# Patient Record
Sex: FEMALE | Race: BLACK OR AFRICAN AMERICAN | Employment: UNEMPLOYED | URBAN - METROPOLITAN AREA
[De-identification: names, ages, dates, MRNs, and addresses within clinical notes are randomized per-mention and may not be internally consistent; named-entity substitution may affect disease eponyms.]

---

## 2023-01-18 ENCOUNTER — HOSPITAL ENCOUNTER (EMERGENCY)
Age: 23
Discharge: HOME | End: 2023-01-18
Payer: MEDICAID

## 2023-01-18 VITALS
SYSTOLIC BLOOD PRESSURE: 134 MMHG | DIASTOLIC BLOOD PRESSURE: 84 MMHG | RESPIRATION RATE: 17 BRPM | TEMPERATURE: 99.2 F | OXYGEN SATURATION: 98 % | HEART RATE: 70 BPM

## 2023-01-18 DIAGNOSIS — Z20.822 LAB TEST NEGATIVE FOR COVID-19 VIRUS: ICD-10-CM

## 2023-01-18 DIAGNOSIS — Z32.02 NEGATIVE PREGNANCY TEST: ICD-10-CM

## 2023-01-18 DIAGNOSIS — B96.89 BACTERIAL VAGINOSIS: ICD-10-CM

## 2023-01-18 DIAGNOSIS — N94.9 GENITAL LESION, FEMALE: ICD-10-CM

## 2023-01-18 DIAGNOSIS — R21 RASH AND OTHER NONSPECIFIC SKIN ERUPTION: ICD-10-CM

## 2023-01-18 DIAGNOSIS — A59.01 TRICHOMONAS VAGINITIS: Primary | ICD-10-CM

## 2023-01-18 DIAGNOSIS — N76.0 BACTERIAL VAGINOSIS: ICD-10-CM

## 2023-01-18 LAB
AMORPHOUS: ABNORMAL /HPF
BACTERIA WET PREP: ABNORMAL
BACTERIA: ABNORMAL /HPF
BILIRUBIN URINE: NEGATIVE
BLOOD, URINE: ABNORMAL
CLARITY: ABNORMAL
CLUE CELLS: ABNORMAL
COLOR: YELLOW
COMMENT UA: ABNORMAL
EPITHELIAL CELLS WET PREP: ABNORMAL
EPITHELIAL CELLS, UA: 5 /HPF (ref 0–5)
GLUCOSE URINE: NEGATIVE MG/DL
HCG(URINE) PREGNANCY TEST: NEGATIVE
HYALINE CASTS: 0 /LPF (ref 0–8)
KETONES, URINE: ABNORMAL MG/DL
LEUKOCYTE ESTERASE, URINE: ABNORMAL
MICROSCOPIC EXAMINATION: YES
MUCUS: ABNORMAL /LPF
NITRITE, URINE: NEGATIVE
PH UA: 6 (ref 5–8)
PROTEIN UA: ABNORMAL MG/DL
RAPID INFLUENZA  B AGN: NEGATIVE
RAPID INFLUENZA A AGN: NEGATIVE
RBC UA: 19 /HPF (ref 0–4)
RBC WET PREP: ABNORMAL
S PYO AG THROAT QL: NEGATIVE
SARS-COV-2, NAAT: NOT DETECTED
SOURCE WET PREP: ABNORMAL
SPECIFIC GRAVITY UA: 1.02 (ref 1–1.03)
TRICHOMONAS PREP: ABNORMAL
URINE REFLEX TO CULTURE: YES
URINE TYPE: ABNORMAL
UROBILINOGEN, URINE: 1 E.U./DL
WBC UA: 23 /HPF (ref 0–5)
WBC WET PREP: ABNORMAL
YEAST WET PREP: ABNORMAL

## 2023-01-18 PROCEDURE — 81001 URINALYSIS AUTO W/SCOPE: CPT

## 2023-01-18 PROCEDURE — 6360000002 HC RX W HCPCS: Performed by: NURSE PRACTITIONER

## 2023-01-18 PROCEDURE — 96372 THER/PROPH/DIAG INJ SC/IM: CPT

## 2023-01-18 PROCEDURE — 87086 URINE CULTURE/COLONY COUNT: CPT

## 2023-01-18 PROCEDURE — 87591 N.GONORRHOEAE DNA AMP PROB: CPT

## 2023-01-18 PROCEDURE — 87081 CULTURE SCREEN ONLY: CPT

## 2023-01-18 PROCEDURE — 87077 CULTURE AEROBIC IDENTIFY: CPT

## 2023-01-18 PROCEDURE — 87635 SARS-COV-2 COVID-19 AMP PRB: CPT

## 2023-01-18 PROCEDURE — 87804 INFLUENZA ASSAY W/OPTIC: CPT

## 2023-01-18 PROCEDURE — 87880 STREP A ASSAY W/OPTIC: CPT

## 2023-01-18 PROCEDURE — 99284 EMERGENCY DEPT VISIT MOD MDM: CPT

## 2023-01-18 PROCEDURE — 87210 SMEAR WET MOUNT SALINE/INK: CPT

## 2023-01-18 PROCEDURE — 87253 VIRUS INOCULATE TISSUE ADDL: CPT

## 2023-01-18 PROCEDURE — 87491 CHLMYD TRACH DNA AMP PROBE: CPT

## 2023-01-18 PROCEDURE — 87252 VIRUS INOCULATION TISSUE: CPT

## 2023-01-18 PROCEDURE — 84703 CHORIONIC GONADOTROPIN ASSAY: CPT

## 2023-01-18 PROCEDURE — 6370000000 HC RX 637 (ALT 250 FOR IP): Performed by: NURSE PRACTITIONER

## 2023-01-18 RX ORDER — METRONIDAZOLE 500 MG/1
500 TABLET ORAL 2 TIMES DAILY
Qty: 14 TABLET | Refills: 0 | Status: SHIPPED | OUTPATIENT
Start: 2023-01-18 | End: 2023-01-25

## 2023-01-18 RX ORDER — DOXYCYCLINE HYCLATE 100 MG
100 TABLET ORAL ONCE
Status: COMPLETED | OUTPATIENT
Start: 2023-01-18 | End: 2023-01-18

## 2023-01-18 RX ORDER — CEFTRIAXONE 500 MG/1
500 INJECTION, POWDER, FOR SOLUTION INTRAMUSCULAR; INTRAVENOUS ONCE
Status: COMPLETED | OUTPATIENT
Start: 2023-01-18 | End: 2023-01-18

## 2023-01-18 RX ORDER — DOXYCYCLINE HYCLATE 100 MG
100 TABLET ORAL 2 TIMES DAILY
Qty: 14 TABLET | Refills: 0 | Status: SHIPPED | OUTPATIENT
Start: 2023-01-18 | End: 2023-01-25

## 2023-01-18 RX ORDER — METRONIDAZOLE 500 MG/1
500 TABLET ORAL ONCE
Status: COMPLETED | OUTPATIENT
Start: 2023-01-18 | End: 2023-01-18

## 2023-01-18 RX ORDER — ACYCLOVIR 800 MG/1
800 TABLET ORAL 3 TIMES DAILY
Qty: 30 TABLET | Refills: 0 | Status: SHIPPED | OUTPATIENT
Start: 2023-01-18 | End: 2023-01-28

## 2023-01-18 RX ORDER — ACYCLOVIR 200 MG/1
400 CAPSULE ORAL ONCE
Status: COMPLETED | OUTPATIENT
Start: 2023-01-18 | End: 2023-01-18

## 2023-01-18 RX ADMIN — DOXYCYCLINE HYCLATE 100 MG: 100 TABLET, COATED ORAL at 16:13

## 2023-01-18 RX ADMIN — ACYCLOVIR 400 MG: 200 CAPSULE ORAL at 16:13

## 2023-01-18 RX ADMIN — METRONIDAZOLE 500 MG: 500 TABLET ORAL at 16:13

## 2023-01-18 RX ADMIN — CEFTRIAXONE SODIUM 500 MG: 500 INJECTION, POWDER, FOR SOLUTION INTRAMUSCULAR; INTRAVENOUS at 16:13

## 2023-01-18 ASSESSMENT — PAIN SCALES - GENERAL: PAINLEVEL_OUTOF10: 10

## 2023-01-18 ASSESSMENT — PAIN - FUNCTIONAL ASSESSMENT: PAIN_FUNCTIONAL_ASSESSMENT: 0-10

## 2023-01-18 NOTE — DISCHARGE INSTRUCTIONS
Return to the emergency department for new or worsening symptoms including, not limited to, developing fever, chills, sweats, inability to tolerate food or drink, joint aches or pains, or other symptoms/concerns.    No sexual intercourse for the next 2 weeks and we do resume sexual activities please do so only with the use of condoms and barriers.    Medications as prescribed.    Work-up.  Today was negative for pregnancy.    Work-up here today was positive for bacterial vaginosis, trichomonas, and that you had lesions concerning for genital herpes.    Follow-up with the local STD clinic or [facility] if you would like additional testing.    He requested empiric treatment here today.  You were medicated with a injection of a medication called Rocephin to treat any potential gonorrhea.  You were given a dose of doxycycline to treat chlamydia.  You will be taking this medication 1 tablet 2 times daily for the next 7 days.  You will be treated for bacterial vaginosis and trichomonas with a medication called Flagyl to be taken 1 tablet 2 times daily for the next 7 days.  Do not drink alcohol with Flagyl as this causes of very severe reaction because she did be very ill if you take this medication within 48 hours of starting and completing.  Also, you will be taking a medication called acyclovir to be taken 1 tablet 4 times daily for the next 10 days to treat potential genital herpes.    STD Clinics  FOR MORE INFORMATION ABOUT STD’S, CONTACT YOUR PHYSICIAN OR:    Sexually Transmitted Disease Clinic                              [facility]                              [address] [place] [address]  [id number] [phone]    [facility]                              [address]  [address]                   [phone]    [facility]                  [address]                   [place], [address]                                           [phone]                      [facility]

## 2023-01-19 LAB
ORGANISM: ABNORMAL
ORGANISM: ABNORMAL
URINE CULTURE, ROUTINE: ABNORMAL
URINE CULTURE, ROUTINE: ABNORMAL

## 2023-01-19 NOTE — RESULT ENCOUNTER NOTE
Culture reviewed and showed bacteria not covered by the patient's prescribed antibiotics at discharge.   Patient to be prescribed amoxicillin 500 mg with instructions take 1 tab 3 times daily for 7 days, dispense 21 tabs 0 refills

## 2023-01-20 LAB
C TRACH DNA GENITAL QL NAA+PROBE: NEGATIVE
N. GONORRHOEAE DNA: NEGATIVE
S PYO THROAT QL CULT: NORMAL

## 2023-01-21 LAB
FINAL REPORT: NORMAL
PRELIMINARY: NORMAL

## 2023-01-21 NOTE — RESULT ENCOUNTER NOTE
Unsuccessful attempts to reach patient by telephone. Certified letter sent to patient's address on file.      Pts does not have an active number on file

## 2023-01-22 NOTE — ED PROVIDER NOTES
[address] [facility]  [address] [address]  Dept: [phone]  Loc: [address] ENCOUNTER        This patient was not seen or evaluated by the attending physician. I evaluated this patient, the attending physician was available for consultation. CHIEF COMPLAINT    Chief Complaint   Patient presents with    Exposure to STD     C/o vaginal itch and lesions       History from patient. Limitations to history: None    HPI    Alea Girard is a 23 y.o. nontoxic, well-appearing, mildly distressed female who presents with concern for STD check as she is experiencing some vaginal itching and has vaginal lesions. Onset was approximately 3-4 days ago. The duration has been constant since the onset. The context is that the patient is sexually active and had a recent unprotected sex. The severity of the symptoms are 10/10. There are no alleviating factors. Patient also endorses concern for COVID-19. She is COVID-19 vaccinated but not boosted. No influenza vaccination.  + Sick contacts. She does endorse a mild sore throat rated severity of 4/10. REVIEW OF SYSTEMS    General: No fevers  ENT: + Sore throat. : + Dysuria, +  discharge.  + Vaginal lesions. GI: No vomiting, no abdominal pain  Skin: No new rashes. + Chronic rash  Musculoskeletal: No arthralgia    PAST MEDICAL & SURGICAL HISTORY    History reviewed. No pertinent past medical history. History reviewed. No pertinent surgical history.     CURRENT MEDICATIONS  (may include discharge medications prescribed in the ED)  Current Outpatient Rx   Medication Sig Dispense Refill    doxycycline hyclate (VIBRA-TABS) 100 MG tablet Take 1 tablet by mouth 2 times daily for 7 days 14 tablet 0    acyclovir (ZOVIRAX) 800 MG tablet Take 1 tablet by mouth 3 times daily for 10 days 30 tablet 0    metroNIDAZOLE (FLAGYL) 500 MG tablet Take 1 tablet by mouth in the morning and 1 tablet in the evening. Do all this for 7 days. 14 tablet 0       ALLERGIES    Allergies   Allergen Reactions    Morphine Other (See Comments)     Iv morphine burns and swells       FAMILY AND SOCIAL HISTORY    History reviewed. No pertinent family history. Social History     Socioeconomic History    Marital status: Single     Spouse name: None    Number of children: None    Years of education: None    Highest education level: None       PHYSICAL EXAM    VITAL SIGNS: /84   Pulse 70   Temp 99.2 °F (37.3 °C)   Resp 17   SpO2 98%    Constitutional:  Well-developed, appears comfortable  Eyes:  Non-icteric sclera, no conjunctival injection   HENT:  Atraumatic, external nose normal.  + Erythematous posterior oropharynx. No exudates. Uvula is midline. Respiratory:  No respiratory distress  Cardiovascular: no JVD  GI:  Abdomen is non-distended, no abdominal tenderness  : Patient requested that I visualized the lesions on her external genitalia and this was a chaperoned exam. Normally developed external genitalia with numerous cutaneous or lesions appearing as ulcerations, no bubo or inguinal lymphadenopathy. Patient declined the speculum exam and chose to self swab. Back: No CVA tenderness  Integument:  Nondiaphoretic skin, no acute rashes. + Chronic rash/hyperpigmented lesions present for \"years\". Musculoskeletal: No obvious joint swelling or limitations of joints range of motion  Neurologic: Awake and oriented, no slurred speech    ED COURSE & MEDICAL DECISION MAKING    See chart for details of medications given. Differential diagnosis: UTI, Pyelonephritis, Chlamydia/Gonorrhea, Yeast vaginitis, Warts (condyloma acuminata), Syphilis, HIV/AIDS, Chancroid (Haemophilus ducreyi), other    Patient is afebrile and nontoxic in appearance. She was treated in the ED empirically with Rocephin and doxycycline. Wet prep positive for trichomonas and clue cells/BV.   Urinalysis clarity cloudy, ketones trace, blood small, protein trace, leukocyte Estrace large. Pregnancy urine: Negative  Culture HSV: Pending  C trichomonas and N gonorrhea: Pending  Strep screen: Negative  Urine culture: Pending  Urine microscopic: Mucus 1+, WBCs 23, RBC 19  Rapid influenza A/B: Negative  COVID-19: Not detected    Patient treated with acyclovir for lesions concerning for HSV with acyclovir 400 mg by mouth, Rocephin 500 mg IM to treat gonorrhea empirically, doxycycline 100 mg tablet orally to treat chlamydia empirically, and Flagyl 500 mg orally to treat trichomonas and BV. I instructed the patient to follow up as an outpatient in 1-2 days. I instructed the patient to have an outpatient HIV and Syphilis test, to be ordered by the follow-up doctor or at a local clinic. I will provide a list of local clinics with the discharge instructions. I instructed the patient not to have sex for 2 weeks. I instructed the patient to return to the ED immediately for any new or worsening symptoms. The patient verbalizes understanding. FINAL IMPRESSION      ICD-10-CM    1. Trichomonas vaginitis  A59.01       2. Bacterial vaginosis  N76.0     B96.89       3. Genital lesion, female  N94.9     Vulvovaginal lesions/ulcerations concerning for herpes genitalis      4. Negative pregnancy test  Z32.02       5. Lab test negative for COVID-19 virus  Z20.822       6.  Rash and other nonspecific skin eruption  R21     diffuse            PLAN  Discharge with outpatient follow-up    (Please note that this note was completed with a voice recognition program.  Every attempt was made to edit the dictations, but inevitably there remain words that are mis-transcribed.)          Savannah Villasenor, RHIANNON - CNP  01/21/23 9930